# Patient Record
Sex: MALE | Race: WHITE | URBAN - METROPOLITAN AREA
[De-identification: names, ages, dates, MRNs, and addresses within clinical notes are randomized per-mention and may not be internally consistent; named-entity substitution may affect disease eponyms.]

---

## 2020-07-07 ENCOUNTER — APPOINTMENT (RX ONLY)
Dept: URBAN - METROPOLITAN AREA CLINIC 151 | Facility: CLINIC | Age: 70
Setting detail: DERMATOLOGY
End: 2020-07-07

## 2020-07-07 PROBLEM — C44.41 BASAL CELL CARCINOMA OF SKIN OF SCALP AND NECK: Status: ACTIVE | Noted: 2020-07-07

## 2020-07-07 PROCEDURE — 17311 MOHS 1 STAGE H/N/HF/G: CPT

## 2020-07-07 PROCEDURE — ? COUNSELING

## 2020-07-07 PROCEDURE — 12032 INTMD RPR S/A/T/EXT 2.6-7.5: CPT

## 2020-07-07 PROCEDURE — ? MOHS SURGERY

## 2020-07-07 PROCEDURE — ? ADDITIONAL NOTES

## 2020-07-07 NOTE — HPI: SKIN LESION (BASAL CELL CARCINOMA)
How Severe Is Your Skin Cancer?: moderate
Is This A New Presentation, Or A Follow-Up?: Basal Cell Carcinoma
When Was Basal Cell Biopsied? (Optional): 05/13/2020 by Dr. Rutledge
Accession # (Optional): [id number]

## 2020-07-17 ENCOUNTER — APPOINTMENT (RX ONLY)
Dept: URBAN - METROPOLITAN AREA CLINIC 150 | Facility: CLINIC | Age: 70
Setting detail: DERMATOLOGY
End: 2020-07-17

## 2020-07-17 DIAGNOSIS — Z48.02 ENCOUNTER FOR REMOVAL OF SUTURES: ICD-10-CM

## 2020-07-17 PROCEDURE — ? SUTURE REMOVAL (GLOBAL PERIOD)

## 2020-07-17 PROCEDURE — 99024 POSTOP FOLLOW-UP VISIT: CPT

## 2020-07-17 ASSESSMENT — LOCATION ZONE DERM: LOCATION ZONE: SCALP

## 2020-07-17 ASSESSMENT — LOCATION DETAILED DESCRIPTION DERM: LOCATION DETAILED: RIGHT CENTRAL PARIETAL SCALP

## 2020-07-17 ASSESSMENT — LOCATION SIMPLE DESCRIPTION DERM: LOCATION SIMPLE: SCALP

## 2020-07-17 NOTE — PROCEDURE: SUTURE REMOVAL (GLOBAL PERIOD)
Detail Level: Detailed
Add 12175 Cpt? (Important Note: In 2017 The Use Of 83279 Is Being Tracked By Cms To Determine Future Global Period Reimbursement For Global Periods): yes

## 2021-06-03 ENCOUNTER — APPOINTMENT (RX ONLY)
Dept: URBAN - METROPOLITAN AREA CLINIC 151 | Facility: CLINIC | Age: 71
Setting detail: DERMATOLOGY
End: 2021-06-03

## 2021-06-03 PROBLEM — C44.319 BASAL CELL CARCINOMA OF SKIN OF OTHER PARTS OF FACE: Status: ACTIVE | Noted: 2021-06-03

## 2021-06-03 PROCEDURE — ? MOHS SURGERY

## 2021-06-03 PROCEDURE — 17311 MOHS 1 STAGE H/N/HF/G: CPT

## 2021-06-03 PROCEDURE — 12051 INTMD RPR FACE/MM 2.5 CM/<: CPT

## 2021-06-03 PROCEDURE — ? COUNSELING

## 2021-06-03 NOTE — PROCEDURE: MOHS SURGERY
Addended by: YEIMI CUBA on: 1/8/2018 12:19 PM     Modules accepted: Orders     Special Stains Stage 1 - Results: Base On Clearance Noted Above

## 2021-06-03 NOTE — HPI: SKIN LESION (BASAL CELL CARCINOMA)
How Severe Is Your Skin Cancer?: mild
Is This A New Presentation, Or A Follow-Up?: Follow Up Basal Cell Carcinoma
When Was Basal Cell Biopsied? (Optional): 05/17/2021
Accession # (Optional): [id number]

## 2021-06-11 ENCOUNTER — APPOINTMENT (RX ONLY)
Dept: URBAN - METROPOLITAN AREA CLINIC 150 | Facility: CLINIC | Age: 71
Setting detail: DERMATOLOGY
End: 2021-06-11

## 2021-06-11 DIAGNOSIS — Z48.02 ENCOUNTER FOR REMOVAL OF SUTURES: ICD-10-CM

## 2021-06-11 PROCEDURE — ? SUTURE REMOVAL (GLOBAL PERIOD)

## 2021-06-11 PROCEDURE — 99024 POSTOP FOLLOW-UP VISIT: CPT

## 2021-06-11 ASSESSMENT — LOCATION DETAILED DESCRIPTION DERM: LOCATION DETAILED: RIGHT INFERIOR CENTRAL MALAR CHEEK

## 2021-06-11 ASSESSMENT — LOCATION ZONE DERM: LOCATION ZONE: FACE

## 2021-06-11 ASSESSMENT — LOCATION SIMPLE DESCRIPTION DERM: LOCATION SIMPLE: RIGHT CHEEK

## 2021-06-11 NOTE — PROCEDURE: SUTURE REMOVAL (GLOBAL PERIOD)
Detail Level: Detailed
Add 22238 Cpt? (Important Note: In 2017 The Use Of 05186 Is Being Tracked By Cms To Determine Future Global Period Reimbursement For Global Periods): yes

## 2022-09-29 NOTE — PROCEDURE: MOHS SURGERY
proximal and distal esophageal biopsies Skin Substitute Text: The defect edges were debeveled with a #15 scalpel blade.  Given the location of the defect, shape of the defect and the proximity to free margins a skin substitute graft was deemed most appropriate.  The graft material was trimmed to fit the size of the defect. The graft was then placed in the primary defect and oriented appropriately.

## 2022-12-14 NOTE — PROCEDURE: MOHS SURGERY
1st attempt; pt had recent ED visit, calling to offer PCP f/u apt (dc 12/11)      Dr. Radha Ortiz   [address] [place] [address]  [phone]    Pt sts she is doing much better and doesn't need PCP F/U appt at this time     Closing encounter Closure 2 Information: This tab is for additional flaps and grafts, including complex repair and grafts and complex repair and flaps. You can also specify a different location for the additional defect, if the location is the same you do not need to select a new one. We will insert the automated text for the repair you select below just as we do for solitary flaps and grafts. Please note that at this time if you select a location with a different insurance zone you will need to override the ICD10 and CPT if appropriate.

## 2023-04-24 NOTE — PROCEDURE: MOHS SURGERY
04/24/23 0945   Assessment   Charting Type Shift assessment   Psychosocial   Psychosocial (WDL) WDL   Neurological   Neuro (WDL) WDL   Level of Consciousness 0   Orientation Level Oriented X4   Cameron Coma Scale   Eye Opening 4   Best Verbal Response 5   Best Motor Response 6   Cameron Coma Scale Score 15   HEENT (Head, Ears, Eyes, Nose, & Throat)   HEENT (WDL) X   Right Ear Hearing aid   Left Ear Deaf   Teeth Partial plate lower;Partial plate upper   Respiratory   Respiratory (WDL) WDL   Respiratory Pattern Regular   Respiratory Depth Normal   L Breath Sounds Clear   R Breath Sounds Clear   Cardiac   Cardiac (WDL) X   Cardiac Regularity Irregular   Heart Sounds S1, S2   Cardiac Symptoms Chest pain  (no c/o chest pain att)   Cardiac Monitor   Telemetry Box Number [id number]   Alarm Audible Yes   Telemetry Monitor Alarm Parameters    Gastrointestinal   Abdominal (WDL) WDL   RUQ Bowel Sounds Active   LUQ Bowel Sounds Active   RLQ Bowel Sounds Active   LLQ Bowel Sounds Active   Genitourinary   Genitourinary (WDL) X   Urine Frequency   Urine Frequency Yes   Peripheral Vascular   Peripheral Vascular (WDL) WDL   Edema Right lower extremity   RLE Edema +2;Pitting   Skin Integumentary    Skin Integumentary (WDL) X   Skin Color Pale   Skin Condition/Temp Warm;Dry Full Thickness Lip Wedge Repair (Flap) Text: Given the location of the defect and the proximity to free margins a full thickness wedge repair was deemed most appropriate.  Using a sterile surgical marker, the appropriate repair was drawn incorporating the defect and placing the expected incisions perpendicular to the vermilion border.  The vermilion border was also meticulously outlined to ensure appropriate reapproximation during the repair.  The area thus outlined was incised through and through with a #15 scalpel blade.  The muscularis and dermis were reaproximated with deep sutures following hemostasis. Care was taken to realign the vermilion border before proceeding with the superficial closure.  Once the vermilion was realigned the superfical and mucosal closure was finished.
